# Patient Record
Sex: MALE | Race: WHITE | URBAN - METROPOLITAN AREA
[De-identification: names, ages, dates, MRNs, and addresses within clinical notes are randomized per-mention and may not be internally consistent; named-entity substitution may affect disease eponyms.]

---

## 2024-01-14 ENCOUNTER — OFFICE VISIT (OUTPATIENT)
Age: 44
End: 2024-01-14

## 2024-01-14 VITALS
OXYGEN SATURATION: 95 % | BODY MASS INDEX: 29.38 KG/M2 | SYSTOLIC BLOOD PRESSURE: 125 MMHG | DIASTOLIC BLOOD PRESSURE: 81 MMHG | HEART RATE: 76 BPM | TEMPERATURE: 98.4 F | WEIGHT: 182 LBS

## 2024-01-14 DIAGNOSIS — L08.9 LOCAL SKIN INFECTION: Primary | ICD-10-CM

## 2024-01-14 RX ORDER — CEPHALEXIN 500 MG/1
500 CAPSULE ORAL 3 TIMES DAILY
Qty: 30 CAPSULE | Refills: 0 | Status: SHIPPED | OUTPATIENT
Start: 2024-01-14 | End: 2024-01-24

## 2024-01-14 RX ORDER — VALACYCLOVIR HYDROCHLORIDE 1 G/1
1000 TABLET, FILM COATED ORAL 3 TIMES DAILY
Qty: 21 TABLET | Refills: 0 | Status: SHIPPED | OUTPATIENT
Start: 2024-01-14 | End: 2024-01-21

## 2024-01-14 NOTE — PATIENT INSTRUCTIONS
Keep hydrated, tylenol or ibuprofen (if no contraindications) as needed if pain or fever..  follow up in  7- days if not better  Return sooner or go to the ER if symptoms worse/feeling worse or has new symptoms or concerns       Return any time if area looking/feeling worse, or has increase pain, increase redness, swelling, or drainage noted....if spreading or any changes as to it's appearance is noted

## 2024-01-14 NOTE — PROGRESS NOTES
Raul Harmon (:  1980) is a 43 y.o. male,New patient, here for evaluation of the following chief complaint(s):  Facial Pain (Left side facial swelling and a knot w/o trauma. Symptoms x 4 days)      ASSESSMENT/PLAN:    ICD-10-CM    1. Local skin infection  L08.9 cephALEXin (KEFLEX) 500 MG capsule     mupirocin (BACTROBAN) 2 % ointment     valACYclovir (VALTREX) 1 g tablet        Will treat for skin infection but also cover for possibility of shingles    Keep hydrated, tylenol or ibuprofen (if no contraindications) as needed if pain or fever..  follow up in  7- days if not better  Return sooner or go to the ER if symptoms worse/feeling worse or has new symptoms or concerns      Return any time if area looking/feeling worse, or has increase pain, increase redness, swelling, or drainage noted....if spreading or any changes as to it's appearance is noted       Go to ER if headache, N/V, fever, flu like symptoms  Discussed return in 3 days for recheck if no changes despite medication, see PCP end of week  for further evaluation      SUBJECTIVE/OBJECTIVE:  Patient presents with:  Facial Pain: Left side facial swelling and a knot w/o trauma. Symptoms x 4 days  Started with local \"rash\" left side of head  within lateral part of scalp/hair . Not aware of any sensation of local burning/  no local vesicles or drainage  No known injury, exposures/contact or insect bite,  no recent haircut  Then yesterday noticed swelling anterior to left ear....\"rash\"/appearance has not changed,  not particularly tender,   No fever/chills, does not feel ill, no headache           History provided by:  Patient   used: No        Vitals:    24 1141   BP: 125/81   Site: Right Upper Arm   Position: Sitting   Cuff Size: Medium Adult   Pulse: 76   Temp: 98.4 °F (36.9 °C)   TempSrc: Oral   SpO2: 95%   Weight: 82.6 kg (182 lb)       Review of Systems   Constitutional:  Negative for fatigue and fever.   HENT:

## 2024-01-17 ENCOUNTER — OFFICE VISIT (OUTPATIENT)
Age: 44
End: 2024-01-17

## 2024-01-17 VITALS
HEART RATE: 82 BPM | OXYGEN SATURATION: 94 % | SYSTOLIC BLOOD PRESSURE: 131 MMHG | DIASTOLIC BLOOD PRESSURE: 76 MMHG | TEMPERATURE: 98 F | WEIGHT: 182 LBS | BODY MASS INDEX: 29.38 KG/M2

## 2024-01-17 DIAGNOSIS — B02.9 HERPES ZOSTER WITHOUT COMPLICATION: Primary | ICD-10-CM

## 2024-01-17 RX ORDER — PREDNISONE 20 MG/1
20 TABLET ORAL
Qty: 15 TABLET | Refills: 0 | Status: SHIPPED | OUTPATIENT
Start: 2024-01-17 | End: 2024-01-22

## 2024-01-17 ASSESSMENT — ENCOUNTER SYMPTOMS
DIARRHEA: 0
SHORTNESS OF BREATH: 0
SINUS PRESSURE: 0
EYE PAIN: 0
SORE THROAT: 0
RHINORRHEA: 0
COUGH: 0
VOMITING: 0

## 2024-01-17 NOTE — PROGRESS NOTES
Raul Harmon (:  1980) is a 43 y.o. male,Established patient, here for evaluation of the following chief complaint(s):  Rash (Worsening swelling left side of face, lymph nodes swelling on neck)      ASSESSMENT/PLAN:  1. Herpes zoster without complication  -pt was advised to finish Valtrex,increase fluid intake.  - predniSONE (DELTASONE) 20 MG tablet; Take 1 tablet by mouth 3 times daily (with meals) for 5 days  Dispense: 15 tablet; Refill: 0       No follow-ups on file.    SUBJECTIVE/OBJECTIVE:  Pt was seen on .on keflex,Bactroban and valtrex D#3,pt stared valtrex yesterday      History provided by:  Patient  Rash  This is a new problem. The current episode started in the past 7 days. The affected locations include the scalp. The rash is characterized by blistering, itchiness and pain. He was exposed to nothing. Pertinent negatives include no anorexia, congestion, cough, diarrhea, eye pain, facial edema, fatigue, fever, joint pain, rhinorrhea, shortness of breath, sore throat or vomiting.       Vitals:    24 1713   BP: 131/76   Site: Right Upper Arm   Position: Sitting   Cuff Size: Medium Adult   Pulse: 82   Temp: 98 °F (36.7 °C)   TempSrc: Oral   SpO2: 94%   Weight: 82.6 kg (182 lb)       Review of Systems   Constitutional:  Negative for activity change, appetite change, fatigue and fever.   HENT:  Negative for congestion, ear pain, rhinorrhea, sinus pressure and sore throat.    Eyes:  Negative for pain.   Respiratory:  Negative for cough and shortness of breath.    Cardiovascular:  Negative for chest pain and leg swelling.   Gastrointestinal:  Negative for anorexia, diarrhea and vomiting.   Musculoskeletal:  Positive for neck pain. Negative for joint pain.   Skin:  Positive for rash.   Neurological:  Negative for dizziness, seizures, syncope, facial asymmetry, speech difficulty, weakness and headaches.       Physical Exam  Constitutional:       General: He is not in acute distress.  HENT:

## 2024-05-20 DIAGNOSIS — M25.511 RIGHT SHOULDER PAIN, UNSPECIFIED CHRONICITY: Primary | ICD-10-CM

## 2024-05-22 ENCOUNTER — HOSPITAL ENCOUNTER (OUTPATIENT)
Age: 44
Discharge: HOME | End: 2024-05-22
Payer: COMMERCIAL

## 2024-05-22 ENCOUNTER — OFFICE VISIT (OUTPATIENT)
Age: 44
End: 2024-05-22
Payer: COMMERCIAL

## 2024-05-22 VITALS — HEIGHT: 66 IN | BODY MASS INDEX: 28.93 KG/M2 | WEIGHT: 180 LBS

## 2024-05-22 DIAGNOSIS — M25.511 RIGHT SHOULDER PAIN, UNSPECIFIED CHRONICITY: ICD-10-CM

## 2024-05-22 DIAGNOSIS — M19.011 ARTHRITIS OF RIGHT ACROMIOCLAVICULAR JOINT: Primary | ICD-10-CM

## 2024-05-22 DIAGNOSIS — M25.811 IMPINGEMENT OF RIGHT SHOULDER: ICD-10-CM

## 2024-05-22 DIAGNOSIS — M75.21 BICEPS TENDONITIS ON RIGHT: ICD-10-CM

## 2024-05-22 PROCEDURE — 20610 DRAIN/INJ JOINT/BURSA W/O US: CPT | Performed by: ORTHOPAEDIC SURGERY

## 2024-05-22 PROCEDURE — 73030 X-RAY EXAM OF SHOULDER: CPT

## 2024-05-22 PROCEDURE — 99203 OFFICE O/P NEW LOW 30 MIN: CPT | Performed by: ORTHOPAEDIC SURGERY

## 2024-05-22 RX ORDER — LIDOCAINE HYDROCHLORIDE 10 MG/ML
5 INJECTION, SOLUTION INFILTRATION; PERINEURAL ONCE
Status: COMPLETED | OUTPATIENT
Start: 2024-05-22 | End: 2024-05-22

## 2024-05-22 RX ORDER — BETAMETHASONE SODIUM PHOSPHATE AND BETAMETHASONE ACETATE 3; 3 MG/ML; MG/ML
12 INJECTION, SUSPENSION INTRA-ARTICULAR; INTRALESIONAL; INTRAMUSCULAR; SOFT TISSUE ONCE
Status: COMPLETED | OUTPATIENT
Start: 2024-05-22 | End: 2024-05-22

## 2024-05-22 RX ADMIN — LIDOCAINE HYDROCHLORIDE 5 ML: 10 INJECTION, SOLUTION INFILTRATION; PERINEURAL at 16:22

## 2024-05-22 RX ADMIN — BETAMETHASONE SODIUM PHOSPHATE AND BETAMETHASONE ACETATE 12 MG: 3; 3 INJECTION, SUSPENSION INTRA-ARTICULAR; INTRALESIONAL; INTRAMUSCULAR; SOFT TISSUE at 16:22

## 2024-05-22 NOTE — PROGRESS NOTES
Date of current encounter: 5/22/2024  Chief Complaint     New Patient (Rt shoulder pain )      History of Present Illness:  Raul Harmon is a 44 y.o. male here for evaluation of right shoulder pain.  He is right-handed.  He works as a .  He denies specific injury but recalls undergoing an injection in this shoulder several years ago.   Records show he was treat in 2016 by Dr. Colon with a cortisone injection.  He reports that coupled with some home exercises resolved his issue at the time.  His current pain is as below and has been present now for several weeks despite OTC ibuprofen, activity modification and rest.  He has pain with reaching and lifting as well as pain with sleeping.  Denies neck pain, numbness or tingling down the arm.  No loss of  strength.    Pain Assessment  Location of Pain: Shoulder  Location Modifiers: Right  Quality of Pain: Sharp, Dull, Throbbing, Popping, Grinding  Duration of Pain: Persistent  Frequency of Pain: Constant  Aggravating Factors: Exercise, Straightening, Stretching    Medical History:  No current outpatient medications on file.     No current facility-administered medications for this visit.     Past Medical History:   Diagnosis Date    Arthritis      No past surgical history on file. allergies, social and family histories were reviewed and updated as appropriate.    Review of Systems:  Relevant review of systems reviewed and available in the patient's chart    Vital Signs:  Ht 1.676 m (5' 6\")   Wt 81.6 kg (180 lb)   BMI 29.05 kg/m²     General Exam:   Constitutional: Patient is adequately groomed with no evidence of malnutrition  Mental Status: The patient is oriented to time, place and person.  The patient's mood and affect are appropriate.  Lymphatic: The lymphatic examination bilaterally reveals all areas to be without enlargement or induration.  Neurological: The patient has good coordination and balance.  There is no focal weakness or sensory

## 2024-06-10 PROBLEM — M75.21 BICEPS TENDONITIS ON RIGHT: Status: ACTIVE | Noted: 2024-06-10

## 2024-06-10 PROBLEM — M25.811 IMPINGEMENT OF RIGHT SHOULDER: Status: ACTIVE | Noted: 2024-06-10
